# Patient Record
Sex: MALE | Race: WHITE | NOT HISPANIC OR LATINO | Employment: FULL TIME | RURAL
[De-identification: names, ages, dates, MRNs, and addresses within clinical notes are randomized per-mention and may not be internally consistent; named-entity substitution may affect disease eponyms.]

---

## 2023-12-11 ENCOUNTER — OFFICE VISIT (OUTPATIENT)
Dept: FAMILY MEDICINE CLINIC | Facility: CLINIC | Age: 58
End: 2023-12-11
Payer: COMMERCIAL

## 2023-12-11 VITALS
RESPIRATION RATE: 18 BRPM | WEIGHT: 217 LBS | DIASTOLIC BLOOD PRESSURE: 90 MMHG | HEIGHT: 68 IN | TEMPERATURE: 98.2 F | OXYGEN SATURATION: 98 % | BODY MASS INDEX: 32.89 KG/M2 | HEART RATE: 73 BPM | SYSTOLIC BLOOD PRESSURE: 148 MMHG

## 2023-12-11 DIAGNOSIS — I10 PRIMARY HYPERTENSION: ICD-10-CM

## 2023-12-11 DIAGNOSIS — K21.9 GASTROESOPHAGEAL REFLUX DISEASE, UNSPECIFIED WHETHER ESOPHAGITIS PRESENT: ICD-10-CM

## 2023-12-11 DIAGNOSIS — Z12.11 SCREEN FOR COLON CANCER: ICD-10-CM

## 2023-12-11 DIAGNOSIS — E78.5 DYSLIPIDEMIA: ICD-10-CM

## 2023-12-11 DIAGNOSIS — Z13.29 SCREENING FOR THYROID DISORDER: ICD-10-CM

## 2023-12-11 DIAGNOSIS — Z23 NEED FOR TDAP VACCINATION: ICD-10-CM

## 2023-12-11 DIAGNOSIS — E11.42 TYPE 2 DIABETES MELLITUS WITH DIABETIC POLYNEUROPATHY, WITHOUT LONG-TERM CURRENT USE OF INSULIN: ICD-10-CM

## 2023-12-11 DIAGNOSIS — Z11.59 NEED FOR HEPATITIS C SCREENING TEST: ICD-10-CM

## 2023-12-11 DIAGNOSIS — E66.9 MILD OBESITY: ICD-10-CM

## 2023-12-11 DIAGNOSIS — Z23 INFLUENZA VACCINATION ADMINISTERED AT CURRENT VISIT: ICD-10-CM

## 2023-12-11 DIAGNOSIS — Z01.818 PREOP EXAMINATION: Primary | ICD-10-CM

## 2023-12-11 DIAGNOSIS — E55.9 VITAMIN D DEFICIENCY: ICD-10-CM

## 2023-12-11 DIAGNOSIS — Z12.5 SCREENING FOR PROSTATE CANCER: ICD-10-CM

## 2023-12-11 LAB
EXPIRATION DATE: ABNORMAL
GLUCOSE BLDC GLUCOMTR-MCNC: 259 MG/DL (ref 70–130)
HBA1C MFR BLD: 7 % (ref 4.5–5.7)
Lab: ABNORMAL

## 2023-12-11 NOTE — PROGRESS NOTES
Venipuncture Blood Specimen Collection  Venipuncture performed in right arm by Sarahi Valladares MA with good hemostasis. Patient tolerated the procedure well without complications.   12/11/23   Sarahi Valladares MA

## 2023-12-11 NOTE — PROGRESS NOTES
New Patient Office Visit      Date: 2023  Patient Name: Raghu Leonardo  : 1965   MRN: [id number]     Chief Complaint:    Chief Complaint   Patient presents with    [facility] Care     Blood sugar issues       History of Present Illness: Raghu Leonardo is a 58 y.o. male who is here today for introductory visit to establish as a patient.  Previous patient of Dr. Talbert in [place].  History of historically difficult to control diabetes mellitus type 2 indicating diagnosis 3 to 4 years ago with a hemoglobin A1c of 14%, most recent hemoglobin A1c by history 12% several months prior.  Currently take metformin 1000 mg twice daily, Amaryl 4 mg daily, Actos 45 mg daily, and up until last week Ozempic 0.5 mg subcu weekly, the latter having been held most recent dose given significant diarrheal side effects.  Also hypertension taking lisinopril/HCTZ 20/25 mg daily, not checking blood pressures regularly, hyperlipidemia currently on atorvastatin 40 mg daily, GERD taking omeprazole 40 mg daily, history of possible steatohepatitis, and obesity.  Denies any chest pains palpitations dyspnea dizziness or edema, occasional bloating sensation in the stomach, GERD well-controlled, having had some loose stools attributed to Ozempic with no melena or hematochezia, improved since stopping Ozempic..  Does occasionally have some shooting pains in his feet but no numbness or tingling and no proprioception problems.  Current with diabetic eye evaluation.  Has never had colon cancer screening, no family history of colon cancer.  Denies any use of tobacco or any significant alcohol consumption.  Due for several vaccinations.    Subjective      Review of Systems:   Review of Systems    Past Medical History:   Past Medical History:   Diagnosis Date    Diabetes mellitus     Hyperlipidemia        Past Surgical History:   Past Surgical History:   Procedure Laterality Date    VASECTOMY         Family History: History reviewed. No  pertinent family history.    Social History:   Social History     Socioeconomic History    Marital status:    Tobacco Use    Smoking status: Never    Smokeless tobacco: Former     Types: Chew   Vaping Use    Vaping Use: Never used   Substance and Sexual Activity    Alcohol use: Never    Drug use: Never    Sexual activity: Not Currently     Partners: Female       Medications:     Current Outpatient Medications:     albuterol sulfate  (90 Base) MCG/ACT inhaler, Inhale 2 puffs Every 4 (Four) Hours As Needed for Wheezing or Shortness of Air for up to 10 days., Disp: 6.7 g, Rfl: 0    Aspirin Low Dose 81 MG EC tablet, Take 1 tablet by mouth Daily., Disp: , Rfl:     atorvastatin (LIPITOR) 40 MG tablet, , Disp: , Rfl:     glimepiride (AMARYL) 4 MG tablet, , Disp: , Rfl:     lisinopril-hydrochlorothiazide (PRINZIDE,ZESTORETIC) 20-25 MG per tablet, , Disp: , Rfl:     metFORMIN (GLUCOPHAGE) 1000 MG tablet, , Disp: , Rfl:     omeprazole (priLOSEC) 40 MG capsule, Take 1 capsule by mouth Daily., Disp: , Rfl:     pioglitazone (ACTOS) 45 MG tablet, , Disp: , Rfl:     azithromycin (ZITHROMAX) 250 MG tablet, 2 tabs orally day 1, 1 tab orally once a days 2 through 5 (Patient not taking: Reported on 12/11/2023), Disp: 6 tablet, Rfl: 0    Continuous Blood Gluc  (Dexcom G6 ) device, See Admin Instructions. (Patient not taking: Reported on 12/11/2023), Disp: , Rfl:     Continuous Blood Gluc Sensor (Dexcom G6 Sensor), CHANGE EVERY 10 DAYS (Patient not taking: Reported on 12/11/2023), Disp: , Rfl:     Continuous Blood Gluc Transmit (Dexcom G6 Transmitter) misc, EVERY DAY (Patient not taking: Reported on 12/11/2023), Disp: , Rfl:     empagliflozin (Jardiance) 10 MG tablet tablet, Take 1 tablet by mouth Daily., Disp: 30 tablet, Rfl: 1    gemfibrozil (Lopid) 600 MG tablet, 600 mg. (Patient not taking: Reported on 12/11/2023), Disp: , Rfl:     pioglitazone (ACTOS) 15 MG tablet, 15 mg. (Patient not taking: Reported  "on 12/11/2023), Disp: , Rfl:     predniSONE (DELTASONE) 10 MG (21) dose pack, Take  by mouth Daily for 6 days. Use as directed on package (Patient not taking: Reported on 12/11/2023), Disp: 1 each, Rfl: 0    promethazine-dextromethorphan (PROMETHAZINE-DM) 6.25-15 MG/5ML syrup, Take 5 mL by mouth 4 (Four) Times a Day As Needed for Cough for up to 10 days. (Patient not taking: Reported on 12/11/2023), Disp: 118 mL, Rfl: 0    Tirzepatide (Mounjaro) 2.5 MG/0.5ML solution pen-injector, Inject 0.5 mL under the skin into the appropriate area as directed Every 7 (Seven) Days., Disp: 2 mL, Rfl: 0    Allergies:   No Known Allergies    Objective     Physical Exam:  Vital Signs:   Vitals:    12/11/23 1433   BP: 148/90   BP Location: Left arm   Patient Position: Sitting   Cuff Size: Adult   Pulse: 73   Resp: 18   Temp: 98.2 °F (36.8 °C)   TempSrc: Temporal   SpO2: 98%   Weight: 98.4 kg (217 lb)   Height: 172.7 cm (68\")     Body mass index is 32.99 kg/m².           Physical Exam  General: Pleasant 58-year-old male, BMI of 32.9 with no prior comparison, no acute distress.  Head and neck: Hair shaven bald, neck supple with no masses or tenderness  Lungs clear with no wheeze tachypnea or cough  Cardiac regular rate rhythm with no murmurs gallops or rubs, no dependent edema  Abdomen with androgenic obesity soft and nontender with no organomegaly or masses and normal bowel sounds  Bipedal exam with 2+ pulses, no edema, normal sensation in both feet to fine touch vibration and pinprick, motor exam intact  Diabetic Foot Exam Performed and Monofilament Test Performed     POCT Results (if applicable):   No results found for this or any previous visit.      ECG 12 Lead    Date/Time: 12/11/2023 3:49 PM  Performed by: Glynn Lujan MD    Authorized by: Glynn Lujan MD  Comparison: not compared with previous ECG   Previous ECG: no previous ECG available  Comments: Normal sinus rhythm rate 65 with baseline artifact, no abnormalities " noted, no prior EKG for comparison         Assessment / Plan      Assessment/Plan:   Diagnoses and all orders for this visit:    1. Preop examination (Primary)  -     CBC & Differential; Future  -     Comprehensive Metabolic Panel; Future  -     ECG 12 Lead  Patient being given referral for introductory screening colonoscopy, with EKG normal, no concerning history regarding administration of conscious sedation, obtain screening labs as noted.  2. Type 2 diabetes mellitus with diabetic polyneuropathy, without long-term current use of insulin  -     MicroAlbumin, Urine, Random - Urine, Clean Catch; Future  -     empagliflozin (Jardiance) 10 MG tablet tablet; Take 1 tablet by mouth Daily.  Dispense: 30 tablet; Refill: 1  -     Tirzepatide (Mounjaro) 2.5 MG/0.5ML solution pen-injector; Inject 0.5 mL under the skin into the appropriate area as directed Every 7 (Seven) Days.  Dispense: 2 mL; Refill: 0  -     POC Glycosylated Hemoglobin (Hb A1C)  -     POC Glucose  Diagnosed by history 3 to 4 years ago, initially having had a hemoglobin A1c approximately 14% per patient, noting several months ago had a hemoglobin A1c of 12%, historically difficult to control.  Patient currently taking Amaryl 4 mg daily, Actos 45 mg daily, and metformin 1000 mg twice daily, also having been taking Ozempic 0.5 mg subcu weekly up until the last week discontinued given significant GI symptoms, and by history also having been prescribed, as I can best determine, Humalog 70/30 with unknown dosing regimen.  Indicates he has not been on Humalog for at least a month, given per his report that his previous physician would not refill this despite multiple calls by the patient.  He does have polyneuropathy manifested by intermittent subjective pain which is not lifestyle limiting primarily in the feet but also the fingers, with no objective abnormalities noted on centimeters today his hemoglobin A1c today is much improved at 7.0% on the above regimen,  but based upon benefits of weight loss and cardiovascular and renal benefits, will initiate trial of Jardiance 10 mg daily with side effect profile and mechanism action discussed, as well as try different GLP 1/GIP agonist using Mounjaro 2.5 mg subcu weekly in place of the Ozempic, with side effect profile potential discussed as well as mechanism of action.  Patient indicates diabetic eye evaluation up-to-date approximately 6 months ago, and does take ACE inhibitor.  Reassess clinically in 1 month.  Continue monitoring blood sugars closely.  3. Primary hypertension  -     Urinalysis With Culture If Indicated -; Future  -     ECG 12 Lead  Stage II elevation acutely, chronic control unknown taking lisinopril/HCTZ 20/25 mg daily.  Check blood pressures regularly with ideal parameters discussed, reassess clinically at his next visit.  EKG today unremarkable.  4. Dyslipidemia  -     Lipid Panel; Future  Taking atorvastatin 40 mg nightly, previously apparently having been on Lopid which was discontinued.  Update lipid profile.  5. Vitamin D deficiency  -     Vitamin D,25-Hydroxy; Future    6. Mild obesity  Discussed need for improvement in lifestyle with regular activity level/exercise along with healthy low glycemic low caloric diet.  Anticipate weight loss benefits with initiation of Mounjaro as noted above.  7. Gastroesophageal reflux disease, unspecified whether esophagitis present  Generally well-controlled taking omeprazole 40 mg daily.  8. Screen for colon cancer  -     Ambulatory Referral For Screening Colonoscopy  Refer for introductory screening colonoscopy.  9. Screening for prostate cancer  -     PSA Screen; Future    10. Screening for thyroid disorder  -     TSH Rfx On Abnormal To Free T4; Future    11. Need for hepatitis C screening test  -     Hepatitis C Antibody; Future    12. Influenza vaccination administered at current visit  -     Fluzone (or Fluarix & Flulaval for VFC) >6mos    13. Need for Tdap  vaccination  -     Tdap Vaccine Greater Than or Equal To 6yo IM    I also advised patient obtain Shingrix and COVID-19 vaccines outside the office, citing safety and efficacy.    65 minutes spent with patient reviewing his extensive health history, updating current complaints, performing physical exam, formulation of treatment plan including extensive discussion regarding medication mechanisms of action, and documentation in EMR.  This time does not include time obtaining and interpreting EKG.    Vaccine Counseling:  “Discussed risks/benefits to vaccination, reviewed components of the vaccine, discussed VIS, discussed informed consent, informed consent obtained. Patient/Parent was allowed to accept or refuse vaccine. Questions answered to satisfactory state of patient/Parent. We reviewed typical age appropriate and seasonally appropriate vaccinations. Reviewed immunization history and updated state vaccination form as needed. Patient was counseled on Influenza  Tdap    Follow Up:   Return in about 1 month (around 1/11/2024) for Annual physical.    At [facility], we believe that sharing information builds trust and better relationships. You are receiving this note because you recently visited [facility]. It is possible you will see health information before a provider has talked with you about it. This kind of information can be easy to misunderstand. To help you fully understand what it means for your health, we urge you to discuss this note with your provider.    Glynn Lujan MD  [facility]

## 2023-12-12 ENCOUNTER — TELEPHONE (OUTPATIENT)
Dept: FAMILY MEDICINE CLINIC | Facility: CLINIC | Age: 58
End: 2023-12-12
Payer: COMMERCIAL

## 2023-12-12 LAB
25(OH)D3+25(OH)D2 SERPL-MCNC: 44.6 NG/ML (ref 30–100)
ALBUMIN SERPL-MCNC: 4.3 G/DL (ref 3.8–4.9)
ALBUMIN/GLOB SERPL: 2.2 {RATIO} (ref 1.2–2.2)
ALP SERPL-CCNC: 91 IU/L (ref 44–121)
ALT SERPL-CCNC: 26 IU/L (ref 0–44)
APPEARANCE UR: CLEAR
AST SERPL-CCNC: 15 IU/L (ref 0–40)
BACTERIA #/AREA URNS HPF: NORMAL /[HPF]
BASOPHILS # BLD AUTO: 0 X10E3/UL (ref 0–0.2)
BASOPHILS NFR BLD AUTO: 0 %
BILIRUB SERPL-MCNC: 0.4 MG/DL (ref 0–1.2)
BILIRUB UR QL STRIP: NEGATIVE
BUN SERPL-MCNC: 23 MG/DL (ref 6–24)
BUN/CREAT SERPL: 17 (ref 9–20)
CALCIUM SERPL-MCNC: 9.9 MG/DL (ref 8.7–10.2)
CASTS URNS QL MICRO: NORMAL /LPF
CHLORIDE SERPL-SCNC: 100 MMOL/L (ref 96–106)
CHOLEST SERPL-MCNC: 95 MG/DL (ref 100–199)
CO2 SERPL-SCNC: 23 MMOL/L (ref 20–29)
COLOR UR: YELLOW
CREAT SERPL-MCNC: 1.37 MG/DL (ref 0.76–1.27)
EGFRCR SERPLBLD CKD-EPI 2021: 60 ML/MIN/1.73
EOSINOPHIL # BLD AUTO: 0.1 X10E3/UL (ref 0–0.4)
EOSINOPHIL NFR BLD AUTO: 1 %
EPI CELLS #/AREA URNS HPF: NORMAL /HPF (ref 0–10)
ERYTHROCYTE [DISTWIDTH] IN BLOOD BY AUTOMATED COUNT: 14 % (ref 11.6–15.4)
GLOBULIN SER CALC-MCNC: 2 G/DL (ref 1.5–4.5)
GLUCOSE SERPL-MCNC: 237 MG/DL (ref 70–99)
GLUCOSE UR QL STRIP: ABNORMAL
HCT VFR BLD AUTO: 39.4 % (ref 37.5–51)
HCV IGG SERPL QL IA: NON REACTIVE
HDLC SERPL-MCNC: 30 MG/DL
HGB BLD-MCNC: 13.2 G/DL (ref 13–17.7)
HGB UR QL STRIP: NEGATIVE
IMM GRANULOCYTES # BLD AUTO: 0.2 X10E3/UL (ref 0–0.1)
IMM GRANULOCYTES NFR BLD AUTO: 2 %
KETONES UR QL STRIP: NEGATIVE
LDLC SERPL CALC-MCNC: 40 MG/DL (ref 0–99)
LEUKOCYTE ESTERASE UR QL STRIP: NEGATIVE
LYMPHOCYTES # BLD AUTO: 3.7 X10E3/UL (ref 0.7–3.1)
LYMPHOCYTES NFR BLD AUTO: 24 %
MCH RBC QN AUTO: 29.1 PG (ref 26.6–33)
MCHC RBC AUTO-ENTMCNC: 33.5 G/DL (ref 31.5–35.7)
MCV RBC AUTO: 87 FL (ref 79–97)
MICRO URNS: ABNORMAL
MICRO URNS: ABNORMAL
MICROALBUMIN UR-MCNC: 11.1 UG/ML
MONOCYTES # BLD AUTO: 0.9 X10E3/UL (ref 0.1–0.9)
MONOCYTES NFR BLD AUTO: 6 %
NEUTROPHILS # BLD AUTO: 10.4 X10E3/UL (ref 1.4–7)
NEUTROPHILS NFR BLD AUTO: 67 %
NITRITE UR QL STRIP: NEGATIVE
PH UR STRIP: 5.5 [PH] (ref 5–7.5)
PLATELET # BLD AUTO: 338 X10E3/UL (ref 150–450)
POTASSIUM SERPL-SCNC: 4.4 MMOL/L (ref 3.5–5.2)
PROT SERPL-MCNC: 6.3 G/DL (ref 6–8.5)
PROT UR QL STRIP: NEGATIVE
PSA SERPL-MCNC: 0.5 NG/ML (ref 0–4)
RBC # BLD AUTO: 4.54 X10E6/UL (ref 4.14–5.8)
RBC #/AREA URNS HPF: NORMAL /HPF (ref 0–2)
SODIUM SERPL-SCNC: 137 MMOL/L (ref 134–144)
SP GR UR STRIP: 1.02 (ref 1–1.03)
T4 FREE SERPL-MCNC: 1.59 NG/DL (ref 0.82–1.77)
TRIGL SERPL-MCNC: 142 MG/DL (ref 0–149)
TSH SERPL DL<=0.005 MIU/L-ACNC: 0.4 UIU/ML (ref 0.45–4.5)
URINALYSIS REFLEX: ABNORMAL
UROBILINOGEN UR STRIP-MCNC: 0.2 MG/DL (ref 0.2–1)
VLDLC SERPL CALC-MCNC: 25 MG/DL (ref 5–40)
WBC # BLD AUTO: 15.3 X10E3/UL (ref 3.4–10.8)
WBC #/AREA URNS HPF: NORMAL /HPF (ref 0–5)

## 2023-12-12 NOTE — TELEPHONE ENCOUNTER
Review of lab testing from 12/11/2023 as follows:    TSH low at 0.96 with normal 0.45-4.50  Free T4 normal at 1.59 with normal 0.82-1.77  Vitamin D 44.6  PSA 0.5  Hepatitis C antibody nonreactive  Total cholesterol 95, triglyceride 142, HDL 30, LDL 40  CMP with a creatinine of 1.37, GFR 60, glucose 237, otherwise normal  CBC with a white count of 15.3 with 0.2 immature granulocytes on differential, hemoglobin 13.2, platelets 338,000  Urinalysis and urine microalbumin both normal  Office POC hemoglobin 7.0%    Assessment/plan:  1.  Diabetes mellitus type 2 with suboptimal to improve control by history from prior value.  Per office note yesterday, adding low-dose Mounjaro 2.5 mg subcu weekly and Jardiance 10 mg daily in addition to his Amaryl, metformin and Actos.  If blood sugars drop less than 80 we will cut back initially on the Amaryl then possibly Actos dosing.  He is to follow-up in 1 month.  2.  Subclinical hyperthyroidism.  Repeat TSH and free T4 adding T3 and thyroid-stimulating antibody at his follow-up visit  3.  Chronic kidney disease stage II.  On ACE inhibitor.  Follow-up within 6 months.  3.  Dyslipidemia, overall satisfactory control on Lipitor 40 mg nightly other than low HDL.  Pursue healthy lifestyle and continue current regimen.  4.  Remainder of laboratory testing satisfactory.    Addendum 12/12/2023:  Unsuccessful attempted contact patient with no option to leave message.  Will reattempt to contact patient tomorrow    Addendum 12/13/2023, 12/14/2023: Message left on answering machine requesting call back regarding test results.    Addendum 12/15/2023:  Phone conversation with patient regarding the above.  Plan as above.

## 2023-12-13 ENCOUNTER — PATIENT ROUNDING (BHMG ONLY) (OUTPATIENT)
Dept: FAMILY MEDICINE CLINIC | Facility: CLINIC | Age: 58
End: 2023-12-13
Payer: COMMERCIAL

## 2024-01-09 DIAGNOSIS — E11.42 TYPE 2 DIABETES MELLITUS WITH DIABETIC POLYNEUROPATHY, WITHOUT LONG-TERM CURRENT USE OF INSULIN: ICD-10-CM

## 2024-01-09 NOTE — TELEPHONE ENCOUNTER
Caller: Raghu Leonardo    Relationship: Self    Best call back number: [phone]     Requested Prescriptions:   Requested Prescriptions     Pending Prescriptions Disp Refills    Tirzepatide (Mounjaro) 2.5 MG/0.5ML solution pen-injector pen 2 mL 0     Sig: Inject 0.5 mL under the skin into the appropriate area as directed Every 7 (Seven) Days.    empagliflozin (Jardiance) 10 MG tablet tablet 30 tablet 1     Sig: Take 1 tablet by mouth Daily.        Pharmacy where request should be sent: [facility]/PHARMACY #[id number] - [place], [place] - [address] [place] [address] - [phone] [facility] [phone] FX     Last office visit with prescribing clinician: 12/11/2023   Last telemedicine visit with prescribing clinician: Visit date not found   Next office visit with prescribing clinician: Visit date not found     Additional details provided by patient: OUT OF THE MOUNJARO    Does the patient have less than a 3 day supply:  [x] Yes  [] No    Would you like a call back once the refill request has been completed: [] Yes [x] No    If the office needs to give you a call back, can they leave a voicemail: [] Yes [x] No    Chavo Dubon, PCT   01/09/24 15:23 EST

## 2024-02-06 DIAGNOSIS — E11.42 TYPE 2 DIABETES MELLITUS WITH DIABETIC POLYNEUROPATHY, WITHOUT LONG-TERM CURRENT USE OF INSULIN: ICD-10-CM

## 2024-02-06 RX ORDER — TIRZEPATIDE 2.5 MG/.5ML
INJECTION, SOLUTION SUBCUTANEOUS
Qty: 5 ML | Refills: 1 | Status: SHIPPED | OUTPATIENT
Start: 2024-02-06

## 2024-03-22 DIAGNOSIS — E11.42 TYPE 2 DIABETES MELLITUS WITH DIABETIC POLYNEUROPATHY, WITHOUT LONG-TERM CURRENT USE OF INSULIN: ICD-10-CM

## 2024-03-22 NOTE — TELEPHONE ENCOUNTER
Caller: [email or web address]    Relationship: Emergency Contact    Best call back number: [phone]     Requested Prescriptions:   Requested Prescriptions     Pending Prescriptions Disp Refills    empagliflozin (Jardiance) 10 MG tablet tablet 30 tablet 0     Sig: Take 1 tablet by mouth Daily.        Pharmacy where request should be sent: [facility]/PHARMACY #[id number] - [address] AT [place] [address] - [phone] [facility] [phone] FX     Last office visit with prescribing clinician: 12/11/2023   Last telemedicine visit with prescribing clinician: Visit date not found   Next office visit with prescribing clinician: Visit date not found     Additional details provided by patient: PATIENT IS COMPLETELY OUT OF THIS MEDICATION    PATIENT WAS INFORMED BY PHARMACY THAT HE WAS OUT OF [employer] REFILLS (PATIENT AND SPOUSE BELIEVE THAT INSURANCE HAS BEEN PUSHING FOR MAIL-IN PHARMACY INSTEAD)    RISA, THE PATIENT'S SPOUSE, STATED THAT THEY WERE TOLD THAT A TEMPORARY SUPPLY COULD BE SENT IN FOR THEM, AND THAT THE PHARMACY WOULD BE ABLE TO WORK THINGS OUT FROM THERE    RISA IS REQUESTING A 10-DAY SUPPLY BE SENT IN FOR THE PATIENT      Does the patient have less than a 3 day supply:  [x] Yes  [] No    Would you like a call back once the refill request has been completed: [] Yes [x] No    If the office needs to give you a call back, can they leave a voicemail: [] Yes [x] No    Debbie Tripp Rep   03/22/24 12:22 EDT

## 2024-03-25 DIAGNOSIS — E11.42 TYPE 2 DIABETES MELLITUS WITH DIABETIC POLYNEUROPATHY, WITHOUT LONG-TERM CURRENT USE OF INSULIN: ICD-10-CM

## 2024-04-02 DIAGNOSIS — E11.42 TYPE 2 DIABETES MELLITUS WITH DIABETIC POLYNEUROPATHY, WITHOUT LONG-TERM CURRENT USE OF INSULIN: ICD-10-CM

## 2024-04-02 NOTE — TELEPHONE ENCOUNTER
Dr. Maki has approved for his rx to be filled. I have spoke with him and have let him know he is due an appt. TF

## 2024-04-09 DIAGNOSIS — E11.42 TYPE 2 DIABETES MELLITUS WITH DIABETIC POLYNEUROPATHY, WITHOUT LONG-TERM CURRENT USE OF INSULIN: ICD-10-CM

## 2024-04-09 RX ORDER — TIRZEPATIDE 2.5 MG/.5ML
INJECTION, SOLUTION SUBCUTANEOUS
Qty: 2 ML | Refills: 0 | Status: SHIPPED | OUTPATIENT
Start: 2024-04-09

## 2024-04-24 ENCOUNTER — TELEPHONE (OUTPATIENT)
Dept: FAMILY MEDICINE CLINIC | Facility: CLINIC | Age: 59
End: 2024-04-24
Payer: COMMERCIAL

## 2024-04-24 NOTE — TELEPHONE ENCOUNTER
CALLED PT TO LET HIM KNOW THAT DR GUERRERO WILL BE OUT OF THE OFFICE MAY 7TH SO WE WILL NEED TO RESCHEDULE THAT APPT. HUB MAY READ AND SCHEDULE.

## 2024-04-25 NOTE — TELEPHONE ENCOUNTER
Name: Raghu Leonardo      Relationship: Self      Best Callback Number:  [phone]       HUB PROVIDED THE RELAY MESSAGE FROM THE OFFICE      PATIENT: VOICED UNDERSTANDING AND HAS NO FURTHER QUESTIONS AT THIS TIME    ADDITIONAL INFORMATION: NO PHYSICALS AVAILABLE FOR REST OF THIS YEAR WITH PCP. WT TO [facility]AN TO GET PATIENT WORKED IN.

## 2024-05-24 ENCOUNTER — OFFICE VISIT (OUTPATIENT)
Dept: FAMILY MEDICINE CLINIC | Facility: CLINIC | Age: 59
End: 2024-05-24
Payer: COMMERCIAL

## 2024-05-24 VITALS
HEART RATE: 81 BPM | TEMPERATURE: 97.6 F | BODY MASS INDEX: 32.19 KG/M2 | OXYGEN SATURATION: 98 % | SYSTOLIC BLOOD PRESSURE: 130 MMHG | WEIGHT: 212.4 LBS | HEIGHT: 68 IN | DIASTOLIC BLOOD PRESSURE: 77 MMHG

## 2024-05-24 DIAGNOSIS — R53.83 FATIGUE, UNSPECIFIED TYPE: ICD-10-CM

## 2024-05-24 DIAGNOSIS — E11.42 TYPE 2 DIABETES MELLITUS WITH DIABETIC POLYNEUROPATHY, WITHOUT LONG-TERM CURRENT USE OF INSULIN: ICD-10-CM

## 2024-05-24 DIAGNOSIS — G57.11 MERALGIA PARESTHETICA OF RIGHT SIDE: ICD-10-CM

## 2024-05-24 DIAGNOSIS — Z00.01 ENCOUNTER FOR GENERAL ADULT MEDICAL EXAMINATION WITH ABNORMAL FINDINGS: Primary | ICD-10-CM

## 2024-05-24 DIAGNOSIS — K21.9 GASTROESOPHAGEAL REFLUX DISEASE, UNSPECIFIED WHETHER ESOPHAGITIS PRESENT: ICD-10-CM

## 2024-05-24 DIAGNOSIS — M54.32 LEFT SIDED SCIATICA: ICD-10-CM

## 2024-05-24 DIAGNOSIS — N18.2 CHRONIC KIDNEY DISEASE, STAGE II (MILD): ICD-10-CM

## 2024-05-24 DIAGNOSIS — E05.90 SUBCLINICAL HYPERTHYROIDISM: ICD-10-CM

## 2024-05-24 DIAGNOSIS — E55.9 VITAMIN D DEFICIENCY: ICD-10-CM

## 2024-05-24 DIAGNOSIS — G56.03 BILATERAL CARPAL TUNNEL SYNDROME: ICD-10-CM

## 2024-05-24 DIAGNOSIS — I10 PRIMARY HYPERTENSION: ICD-10-CM

## 2024-05-24 DIAGNOSIS — Z23 NEED FOR PROPHYLACTIC VACCINATION AGAINST STREPTOCOCCUS PNEUMONIAE (PNEUMOCOCCUS): ICD-10-CM

## 2024-05-24 DIAGNOSIS — E66.9 MILD OBESITY: ICD-10-CM

## 2024-05-24 DIAGNOSIS — E78.5 DYSLIPIDEMIA: ICD-10-CM

## 2024-05-24 LAB
EXPIRATION DATE: ABNORMAL
EXPIRATION DATE: ABNORMAL
GLUCOSE BLDC GLUCOMTR-MCNC: 185 MG/DL (ref 70–130)
HBA1C MFR BLD: 6 % (ref 4.5–5.7)
Lab: ABNORMAL
Lab: ABNORMAL

## 2024-05-24 RX ORDER — AMITRIPTYLINE HYDROCHLORIDE 25 MG/1
25 TABLET, FILM COATED ORAL NIGHTLY
Qty: 90 TABLET | Refills: 3 | Status: SHIPPED | OUTPATIENT
Start: 2024-05-24

## 2024-05-24 NOTE — ASSESSMENT & PLAN NOTE
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  Discussed need to attempt weight loss and avoid constrictive closing and belts.

## 2024-05-24 NOTE — ASSESSMENT & PLAN NOTE
Most recent creatinine 1.37 with GFR 16 12/2023.  Taking lisinopril/HCTZ 20/25 mg daily.  Will repeat BMP and make further recommendations accordingly.

## 2024-05-24 NOTE — ASSESSMENT & PLAN NOTE
Describes chronic intermittent left-sided numbness and tingling discomfort in his entire leg.  Ports chiropractic manipulation extensively with no improvement.  Initiate trial of conservative dose of amitriptyline 25 mg nightly with side effect profile potential discussed.  Advise if problems or for lack of therapeutic benefit.

## 2024-05-24 NOTE — ASSESSMENT & PLAN NOTE
5 pound weight loss in the last 5 months, likely augmented by use of Mounjaro 2.5 mg subcu weekly, this being prescribed primarily for his diabetes.  Increasing Mounjaro further up to 5 mg subcu weekly for improved glycemic control and cardiovascular benefits, anticipating likely to have further weight loss, also pursue healthy lifestyle efforts.

## 2024-05-24 NOTE — ASSESSMENT & PLAN NOTE
Mildly depressed TSH with normal free T4 per testing in 12/2023.  Repeat TSH, free T4, adding thyroid-stimulating antibody, calling for results.

## 2024-05-24 NOTE — ASSESSMENT & PLAN NOTE
Recommended initial trial of bilateral wrist splints with proper positioning discussed.  Advise if not helping.

## 2024-05-24 NOTE — PROGRESS NOTES
Male Physical Note      Date: 2024   Patient Name: Raghu Leonardo  : 1965   MRN: [id number]     Chief Complaint:    Chief Complaint   Patient presents with    Annual Exam       History of Present Illness: Raghu Leonardo is a 58 y.o. male who is here today for their annual health maintenance and physical.  Seen 5 months ago in the office having metabolic syndrome with diabetes hypertension hyperlipidemia and obesity.  Hemoglobin A1c at that time 7.0% taking metformin 1000 mg twice daily, Amaryl 4 mg daily, Actos 45 mg daily, having had the addition of Jardiance 10 mg daily and Mounjaro 2.5 mg weekly.  Blood sugars have been averaging 1 30-1 40 fasting, with hemoglobin A1c today improved to 6.0%.  He does note occasionally having some low sugars in the 60s at nighttime but otherwise no major problems.  Due for his yearly diabetic eye evaluation.  Taking lisinopril HCTZ for hypertension with blood pressures normal, hyperlipidemia taking atorvastatin but no longer on Lopid, GERD well-controlled with omeprazole, per lab testing 2023 had a slightly low TSH with normal free T4 consistent with subclinical hyperthyroidism due for repeat testing, also chronic kidney disease stage II with creatinine 1.37 and GFR 16 2023.  Obesity having a 5 pound weight loss in the last 6 months.  Denies chest pains palpitations dyspnea dizziness or edema.  His primary complaint relates to bilateral intermittent numbness of his hands especially with use, as well as when he wakes up in the morning, as well as an intermittent numbness on the right anterolateral thigh and knee discomfort associate with the tingling and numbness of his entire leg down to the foot but no actual back pain and no weakness.  Denies any loss of bowel or bladder control.  He has seen a chiropractor multiple times with no benefit in his symptoms.  He does have some vague fatigue but no fevers chills or sweats.  No  or GI complaints at this  "time.  Health maintenance includes colonoscopy obtained approximately 1/2024 by history with Dr. Hernandez, patient reporting \"normal\", with procedure report currently pending to me, EKG in 12/2023 normal, full set of labs in 12/2023 obtained with CKD 2 and his subclinical hyperthyroidism findings as noted, due for Prevnar 20 Shingrix and COVID-19 vaccines, status post Tdap last visit.  Due for diabetic eye evaluation.  Due for updated limited lab testing to reassess his renal function and thyroid..      Subjective      Review of Systems:   Review of Systems    Past Medical History, Social History, Family History and Care Team were all reviewed with patient and updated as appropriate.     Medications:     Current Outpatient Medications:     atorvastatin (LIPITOR) 40 MG tablet, , Disp: , Rfl:     empagliflozin (Jardiance) 10 MG tablet tablet, Take 1 tablet by mouth Daily. NEEDS AN APPT, Disp: 90 tablet, Rfl: 0    lisinopril-hydrochlorothiazide (PRINZIDE,ZESTORETIC) 20-25 MG per tablet, , Disp: , Rfl:     metFORMIN (GLUCOPHAGE) 1000 MG tablet, , Disp: , Rfl:     omeprazole (priLOSEC) 40 MG capsule, Take 1 capsule by mouth Daily., Disp: , Rfl:     pioglitazone (ACTOS) 15 MG tablet, 1 tablet., Disp: , Rfl:     amitriptyline (ELAVIL) 25 MG tablet, Take 1 tablet by mouth Every Night. For leg pain, Disp: 90 tablet, Rfl: 3    Tirzepatide (MOUNJARO) 5 MG/0.5ML solution pen-injector pen, Inject 0.5 mL under the skin into the appropriate area as directed 1 (One) Time Per Week., Disp: 2 mL, Rfl: 0    Allergies:   No Known Allergies    Immunizations:  Health Maintenance Summary            Overdue - COLORECTAL CANCER SCREENING (View Topic Details) Never done      No completion, postpone, or frequency change history exists for this topic.              Overdue - DIABETIC EYE EXAM (Yearly) Never done      No completion, postpone, or frequency change history exists for this topic.              Overdue - Hepatitis B (1 of 3 - 19+ " 3-dose series) Never done      No completion, postpone, or frequency change history exists for this topic.              Postponed - ZOSTER VACCINE (1 of 2) Postponed until 5/24/2024 05/24/2024  Postponed until 5/24/2024 by Huong Holliday (Patient Refused - will get at pharmacy)              Postponed - COVID-19 Vaccine (3 - 2023-24 season) Postponed until 12/31/2024 05/24/2024  Postponed until 12/31/2024 by Huong Holliday (Patient Refused - thinking about will get at pharmacy)    06/20/2021  Imm Admin: COVID-19 (MODERNA) 1st,2nd,3rd Dose Monovalent    05/23/2021  Imm Admin: COVID-19 (MODERNA) 1st,2nd,3rd Dose Monovalent              INFLUENZA VACCINE (Yearly - August to March) Next due on 8/1/2024 12/11/2023  Imm Admin: Fluzone (or Fluarix & Flulaval for VFC) >6mos              HEMOGLOBIN A1C (Every 6 Months) Next due on 11/24/2024 05/24/2024  Hemoglobin A1C component of POC Glycosylated Hemoglobin (Hb A1C)    12/11/2023  Hemoglobin A1C component of POC Glycosylated Hemoglobin (Hb A1C)              DIABETIC FOOT EXAM (Yearly) Next due on 12/11/2024 12/11/2023  SmartData: WORKFLOW - DIABETES - DIABETIC FOOT EXAM PERFORMED    12/11/2023  SmartData: FINDINGS - TESTS - NEUROLOGY - MONOFILAMENT TEST - MONOFILAMENT TEST PERFORMED    12/11/2023  SmartData: WORKFLOW - DIABETES - DIABETIC FOOT EXAM PERFORMED              LIPID PANEL (Yearly) Next due on 12/11/2024 12/11/2023  Lipid Panel              URINE MICROALBUMIN (Yearly) Next due on 12/11/2024 12/11/2023  Microalbumin, Urine component of MicroAlbumin, Urine, Random - Urine, Clean Catch              ANNUAL PHYSICAL (Yearly) Next due on 5/24/2025 05/24/2024  Done              BMI FOLLOWUP (Yearly) Next due on 5/24/2025 05/24/2024  Smart[employer]: WORKFLOW - QUALITY MEASUREMENT - DOCUMENTED WEIGHT FOLLOW-UP PLAN              TDAP/TD VACCINES (2 - Td or Tdap) Next due on 12/11/2033 12/11/2023  Imm Admin: Tdap               "HEPATITIS C SCREENING  Completed      12/11/2023  Hep C Virus Ab component of Hepatitis C Antibody              Pneumococcal Vaccine 0-64 (Series Information) Completed      05/24/2024  Imm Admin: Pneumococcal Conjugate 20-Valent (PCV20)                    Orders Placed This Encounter   Procedures    Pneumococcal Conjugate Vaccine 20-Valent All       Colorectal Screening:   Approximately 1/2024, reportedly normal with procedure report to me currently pending  Last Completed Colonoscopy       This patient has no relevant Health Maintenance data.          CT for Smoker (Age 50-80, 20pk yr within last 15 years): N/A  Bone Density/DEXA (high risk): N/A  Hep C (Age 18-79 once): Negative  HIV (Age 15-65 once) : N/A  PSA (Over age 50, C Level Recommendation): Normal  US Aorta (For male smokers, age 65): N/A  A1c:   Hemoglobin A1C   Date Value Ref Range Status   05/24/2024 6.0 (A) 4.5 - 5.7 % Final     Lipid panel: No results found for: \"LABLIPI\"    The ASCVD Risk score (Enzo LUNA, et al., 2019) failed to calculate for the following reasons:    The valid total cholesterol range is 130 to 320 mg/dL    Dermatology: N/A  Ophthalmologist: Due for update  Dentist: Regular checkups pursued    Tobacco Use: Medium Risk (5/24/2024)    Patient History     Smoking Tobacco Use: Never     Smokeless Tobacco Use: Former     Passive Exposure: Not on file       Social History     Substance and Sexual Activity   Alcohol Use Never        Social History     Substance and Sexual Activity   Drug Use Never        Diet/Physical activity: Healthy diet, moderate physical activity    Sexual health: No concern, contraception used    PHQ-2 Depression Screening  PHQ-9 Total Score:           Objective     Physical Exam:  Vital Signs:   Vitals:    05/24/24 1535   BP: 130/77   BP Location: Left arm   Patient Position: Sitting   Cuff Size: Adult   Pulse: 81   Temp: 97.6 °F (36.4 °C)   TempSrc: Temporal   SpO2: 98%   Weight: 96.3 kg (212 lb 6.4 oz)   Height: " "172.7 cm (68\")     Facility age limit for growth %kirk is 20 years.  Body mass index is 32.3 kg/m².     Physical Exam  Vitals and nursing note reviewed.   Constitutional:       General: He is not in acute distress.     Appearance: Normal appearance. He is obese. He is not ill-appearing.      Comments: Pleasant, healthy, no acute distress, alert and oriented, fluent speech, BMI 32.3 representing a 5 pound weight loss in the last 5 months   HENT:      Head: Normocephalic and atraumatic.      Right Ear: Tympanic membrane, ear canal and external ear normal.      Left Ear: Tympanic membrane, ear canal and external ear normal.      Nose: Nose normal.      Mouth/Throat:      Mouth: Mucous membranes are moist.      Pharynx: Oropharynx is clear.      Comments: Good dentition  Eyes:      Extraocular Movements: Extraocular movements intact.      Conjunctiva/sclera: Conjunctivae normal.      Pupils: Pupils are equal, round, and reactive to light.   Neck:      Vascular: No carotid bruit.      Comments: No cervical periclavicular or axillary or inguinal adenopathy  Cardiovascular:      Rate and Rhythm: Normal rate and regular rhythm.      Pulses: Normal pulses.      Heart sounds: Normal heart sounds. No murmur heard.     No friction rub. No gallop.      Comments: 2+ peripheral pulses with no carotid bruits, no dependent edema  Pulmonary:      Effort: Pulmonary effort is normal. No respiratory distress.      Breath sounds: Normal breath sounds.   Abdominal:      General: Bowel sounds are normal. There is no distension.      Palpations: Abdomen is soft. There is no mass.      Tenderness: There is no abdominal tenderness. There is no guarding or rebound.      Hernia: No hernia is present.   Genitourinary:     Comments: Normal circumcised male with testes descended bilaterally without nodules or tenderness, no inguinal herniation or adenopathy, rectal exam deferred given recent colonoscopy with digital exam performed at that " time  Musculoskeletal:         General: No swelling, tenderness or deformity. Normal range of motion.      Cervical back: Normal range of motion and neck supple. No rigidity or tenderness.      Right lower leg: No edema.      Left lower leg: No edema.   Lymphadenopathy:      Cervical: No cervical adenopathy.   Skin:     General: Skin is warm and dry.      Capillary Refill: Capillary refill takes less than 2 seconds.      Findings: No lesion or rash.   Neurological:      Mental Status: He is alert and oriented to person, place, and time. Mental status is at baseline.      Cranial Nerves: No cranial nerve deficit.      Sensory: Sensory deficit present.      Motor: No weakness.      Coordination: Coordination normal.      Gait: Gait normal.      Deep Tendon Reflexes: Reflexes normal.      Comments: Subjective decrease sensation intermittently in the right anterolateral thigh as well as left leg more noted with ambulation and standing, positive Phalen sign both wrists with negative Tinel sign, normal strength and sensation otherwise   Psychiatric:         Mood and Affect: Mood normal.         Behavior: Behavior normal.         Thought Content: Thought content normal.         Judgment: Judgment normal.      Diabetic Foot Exam Performed and Monofilament Test Performed     POCT Results (if applicable):   Results for orders placed or performed in visit on 05/24/24   POC Glycosylated Hemoglobin (Hb A1C)    Specimen: Blood   Result Value Ref Range    Hemoglobin A1C 6.0 (A) 4.5 - 5.7 %    Lot Number [id number]     Expiration Date 01/24/2026    POC Glucose, Blood    Specimen: Blood   Result Value Ref Range    Glucose 185 (A) 70 - 130 mg/dL    Lot Number [id number]     Expiration Date 10/19/2024        Procedures    Assessment / Plan      Assessment/Plan:   Diagnoses and all orders for this visit:    1. Encounter for general adult medical examination with abnormal findings (Primary)  Assessment & Plan:  Pleasant 58-year-old male  presenting for complete physical, health issues being addressed as detailed below, health maintenance includes full set of laboratory testing from 12/2023 satisfactory other than updating limited testing as detailed below, colonoscopy from proximately 1/2024 reported by patient to be normal, data deficient with plan to obtain procedure report, administer Prevnar 20 today and recommend obtaining Shingrix and COVID-19 vaccines outside the office, keep up-to-date with flu vaccine recommendations, update diabetic eye evaluation.      2. Type 2 diabetes mellitus with diabetic polyneuropathy, without long-term current use of insulin  Assessment & Plan:  Some sensory changes more so on the left leg which may be related to sciatica, no abnormalities noted otherwise, hemoglobin A1c 7.0% in 12/2023 improved to 6.0% today taking multiple meds including metformin 1000 mg twice daily, Amaryl 4 mg daily, Actos 45 mg daily, Jardiance 10 mg daily, and Mounjaro 2.5 mg subcu weekly.  Given tight glycemic control, will plan to discontinue Amaryl well increase Mounjaro up to 5 mg subcu weekly, this given overall cardiovascular benefits as well as to further augment attempts at weight loss.  If his blood glucose is running consistently less than 80, he has been advised subsequently to discontinue his Actos as the next step.  Advised of any ongoing issues subsequently.  Patient due for yearly diabetic eye evaluation.  Continue healthy lifestyle efforts.    Orders:  -     POC Glycosylated Hemoglobin (Hb A1C)  -     POC Glucose, Blood  -     Tirzepatide (MOUNJARO) 5 MG/0.5ML solution pen-injector pen; Inject 0.5 mL under the skin into the appropriate area as directed 1 (One) Time Per Week.  Dispense: 2 mL; Refill: 0  -     Pneumococcal Conjugate Vaccine 20-Valent All    3. Primary hypertension  Assessment & Plan:  EKG performed in 12/2023 very satisfactory thus not repeated today.  Very satisfactory control taking lisinopril/HCTZ 20/25 mg  daily.  Continue current regimen.    Orders:  -     Basic Metabolic Panel; Future    4. Dyslipidemia  Assessment & Plan:  Satisfactory lipid profile in 12/2023 taking a atorvastatin 40 mg nightly.      5. Vitamin D deficiency    6. Subclinical hyperthyroidism  Assessment & Plan:  Mildly depressed TSH with normal free T4 per testing in 12/2023.  Repeat TSH, free T4, adding thyroid-stimulating antibody, calling for results.    Orders:  -     TSH; Future  -     T4, Free; Future  -     Thyroid Stimulating Immunoglobulin; Future    7. Chronic kidney disease, stage II (mild)  Assessment & Plan:  Most recent creatinine 1.37 with GFR 16 12/2023.  Taking lisinopril/HCTZ 20/25 mg daily.  Will repeat BMP and make further recommendations accordingly.    Orders:  -     Basic Metabolic Panel; Future    8. Mild obesity  Assessment & Plan:  5 pound weight loss in the last 5 months, likely augmented by use of Mounjaro 2.5 mg subcu weekly, this being prescribed primarily for his diabetes.  Increasing Mounjaro further up to 5 mg subcu weekly for improved glycemic control and cardiovascular benefits, anticipating likely to have further weight loss, also pursue healthy lifestyle efforts.      9. Gastroesophageal reflux disease, unspecified whether esophagitis present  Assessment & Plan:  Good control of his GERD symptoms taking omeprazole 40 mg daily.      10. Bilateral carpal tunnel syndrome  Assessment & Plan:  Recommended initial trial of bilateral wrist splints with proper positioning discussed.  Advise if not helping.      11. Meralgia paresthetica of right side  Assessment & Plan:  Discussed pathophysiology of this disorder with compression of the lateral femoral cutaneous nerve.  Discussed need to attempt weight loss and avoid constrictive closing and belts.      12. Left sided sciatica  Assessment & Plan:  Describes chronic intermittent left-sided numbness and tingling discomfort in his entire leg.  [facility] chiropractic  manipulation extensively with no improvement.  Initiate trial of conservative dose of amitriptyline 25 mg nightly with side effect profile potential discussed.  Advise if problems or for lack of therapeutic benefit.      13. Fatigue, unspecified type  Assessment & Plan:  Describes nonspecific fatigue though certainly appears clinically well.  Did have extensive laboratory testing in 12/2023 with only related mild abnormality being subclinical hyperthyroidism not likely relevant.  Will update limited testing including repeat TFTs, BMP, and testosterone level.    Orders:  -     Testosterone; Future    14. Need for prophylactic vaccination against Streptococcus pneumoniae (pneumococcus)  -     Pneumococcal Conjugate Vaccine 20-Valent All    Other orders  -     amitriptyline (ELAVIL) 25 MG tablet; Take 1 tablet by mouth Every Night. For leg pain  Dispense: 90 tablet; Refill: 3         Healthcare Maintenance:  Counseling provided based on age appropriate USPSTF guidelines.  BMI is >= 30 and <35. (Class 1 Obesity). The following options were offered after discussion;: exercise counseling/recommendations, nutrition counseling/recommendations, and prescribed Mounjaro primarily for diabetes but also added benefit regarding weight loss    Raghu Leonardo voices understanding and acceptance of this advice and will call back with any further questions or concerns. AVS with preventive healthcare tips printed for patient.     Vaccine Counseling:  “Discussed risks/benefits to vaccination, reviewed components of the vaccine, discussed VIS, discussed informed consent, informed consent obtained. Patient/Parent was allowed to accept or refuse vaccine. Questions answered to satisfactory state of patient/Parent. We reviewed typical age appropriate and seasonally appropriate vaccinations. Reviewed immunization history and updated state vaccination form as needed. Patient was counseled on Prevnar 20    Follow Up:   Return in about 3 months  (around 8/24/2024).    At [facility], we believe that sharing information builds trust and better relationships. You are receiving this note because you recently visited [facility]. It is possible you will see health information before a provider has talked with you about it. This kind of information can be easy to misunderstand. To help you fully understand what it means for your health, we urge you to discuss this note with your provider.    Glynn Lujan MD  [facility] [place] [facility]

## 2024-05-24 NOTE — ASSESSMENT & PLAN NOTE
Describes nonspecific fatigue though certainly appears clinically well.  Did have extensive laboratory testing in 12/2023 with only related mild abnormality being subclinical hyperthyroidism not likely relevant.  Will update limited testing including repeat TFTs, BMP, and testosterone level.

## 2024-05-24 NOTE — ASSESSMENT & PLAN NOTE
Pleasant 58-year-old male presenting for complete physical, health issues being addressed as detailed below, health maintenance includes full set of laboratory testing from 12/2023 satisfactory other than updating limited testing as detailed below, colonoscopy from proximately 1/2024 reported by patient to be normal, data deficient with plan to obtain procedure report, administer Prevnar 20 today and recommend obtaining Shingrix and COVID-19 vaccines outside the office, keep up-to-date with flu vaccine recommendations, update diabetic eye evaluation.

## 2024-05-24 NOTE — ASSESSMENT & PLAN NOTE
Some sensory changes more so on the left leg which may be related to sciatica, no abnormalities noted otherwise, hemoglobin A1c 7.0% in 12/2023 improved to 6.0% today taking multiple meds including metformin 1000 mg twice daily, Amaryl 4 mg daily, Actos 45 mg daily, Jardiance 10 mg daily, and Mounjaro 2.5 mg subcu weekly.  Given tight glycemic control, will plan to discontinue Amaryl well increase Mounjaro up to 5 mg subcu weekly, this given overall cardiovascular benefits as well as to further augment attempts at weight loss.  If his blood glucose is running consistently less than 80, he has been advised subsequently to discontinue his Actos as the next step.  Advised of any ongoing issues subsequently.  Patient due for yearly diabetic eye evaluation.  Continue healthy lifestyle efforts.

## 2024-05-30 ENCOUNTER — TELEPHONE (OUTPATIENT)
Dept: FAMILY MEDICINE CLINIC | Facility: CLINIC | Age: 59
End: 2024-05-30
Payer: COMMERCIAL

## 2024-05-30 DIAGNOSIS — E05.90 SUBCLINICAL HYPERTHYROIDISM: ICD-10-CM

## 2024-05-30 DIAGNOSIS — N18.32 STAGE 3B CHRONIC KIDNEY DISEASE: Primary | ICD-10-CM

## 2024-05-30 DIAGNOSIS — I10 PRIMARY HYPERTENSION: ICD-10-CM

## 2024-05-30 RX ORDER — LISINOPRIL 20 MG/1
20 TABLET ORAL DAILY
Qty: 90 TABLET | Refills: 0 | Status: SHIPPED | OUTPATIENT
Start: 2024-05-30

## 2024-05-30 NOTE — TELEPHONE ENCOUNTER
Conversation with patient regarding results of recent laboratory testing on 5/29/2024 as follows:    BMP revealed a creatinine 1.6 and GFR 35, versus prior creatinine of 1.37 and GFR of 65 months prior  TSH and free T4 both normal    Assessment/plan:  1.  Chronic kidney disease.  Has had some progression from stage II down to stage IIIb, noting does take lisinopril/HCTZ 20/25 mg daily as an antihypertensive regimen.  Plan changed to plain lisinopril 20 mg daily, monitoring blood pressures closely, and repeat a BMP in 1 month to assess whether this change improves his renal function.  Monitor blood pressures closely to ensure does not become elevated consistently greater than 140/90.  2.  Prior subclinical hypothyroidism with normalization of thyroid function testing.

## 2024-06-11 ENCOUNTER — TELEPHONE (OUTPATIENT)
Dept: FAMILY MEDICINE CLINIC | Facility: CLINIC | Age: 59
End: 2024-06-11
Payer: COMMERCIAL

## 2024-06-11 NOTE — TELEPHONE ENCOUNTER
Caller: carlos larry    Relationship: Emergency Contact    Best call back number: [phone]     Requested Prescriptions:   -ALL CURRENT PRESCRIPTIONS. PLEASE WRITE FOR 90 DAYS     Pharmacy where request should be sent: [facility] - [address] [phone] [facility] [phone] FX     Last office visit with prescribing clinician: 5/24/2024   Last telemedicine visit with prescribing clinician: Visit date not found   Next office visit with prescribing clinician: Visit date not found     Additional details provided by patient: PATIENT'S WIFE STATED THAT THEY ARE WANTING TO MOVE ALL OF HIS PRESCRIPTIONS TO [facility].    PATIENT IS OKAY ON CURRENT SUPPLY.     PLEASE NOTIFY PATIENT WHEN THESE PRESCRIPTIONS HAVE BEEN CALLED IN OR IF THERE ARE ANY QUESTIONS/CONCERNS.     Does the patient have less than a 3 day supply:  [] Yes  [x] No    Would you like a call back once the refill request has been completed: [x] Yes [] No    If the office needs to give you a call back, can they leave a voicemail: [x] Yes [] No    Debbie Alford Rep   06/11/24 15:47 EDT

## 2024-06-17 ENCOUNTER — TELEPHONE (OUTPATIENT)
Dept: FAMILY MEDICINE CLINIC | Facility: CLINIC | Age: 59
End: 2024-06-17
Payer: COMMERCIAL

## 2024-06-17 DIAGNOSIS — I10 PRIMARY HYPERTENSION: ICD-10-CM

## 2024-06-17 DIAGNOSIS — E11.42 TYPE 2 DIABETES MELLITUS WITH DIABETIC POLYNEUROPATHY, WITHOUT LONG-TERM CURRENT USE OF INSULIN: ICD-10-CM

## 2024-06-17 RX ORDER — ATORVASTATIN CALCIUM 40 MG/1
40 TABLET, FILM COATED ORAL NIGHTLY
Qty: 90 TABLET | Refills: 1 | Status: SHIPPED | OUTPATIENT
Start: 2024-06-17

## 2024-06-17 RX ORDER — AMITRIPTYLINE HYDROCHLORIDE 25 MG/1
25 TABLET, FILM COATED ORAL NIGHTLY
Qty: 90 TABLET | Refills: 1 | Status: SHIPPED | OUTPATIENT
Start: 2024-06-17

## 2024-06-17 RX ORDER — LISINOPRIL 20 MG/1
20 TABLET ORAL DAILY
Qty: 90 TABLET | Refills: 1 | Status: SHIPPED | OUTPATIENT
Start: 2024-06-17

## 2024-06-17 RX ORDER — OMEPRAZOLE 40 MG/1
40 CAPSULE, DELAYED RELEASE ORAL DAILY
Qty: 90 CAPSULE | Refills: 1 | Status: SHIPPED | OUTPATIENT
Start: 2024-06-17

## 2024-06-17 RX ORDER — PIOGLITAZONEHYDROCHLORIDE 15 MG/1
15 TABLET ORAL DAILY
Qty: 90 TABLET | Refills: 1 | Status: SHIPPED | OUTPATIENT
Start: 2024-06-17

## 2024-07-02 DIAGNOSIS — E11.42 TYPE 2 DIABETES MELLITUS WITH DIABETIC POLYNEUROPATHY, WITHOUT LONG-TERM CURRENT USE OF INSULIN: ICD-10-CM

## 2024-07-02 RX ORDER — TIRZEPATIDE 2.5 MG/.5ML
INJECTION, SOLUTION SUBCUTANEOUS
OUTPATIENT
Start: 2024-07-02

## 2024-08-19 DIAGNOSIS — E11.42 TYPE 2 DIABETES MELLITUS WITH DIABETIC POLYNEUROPATHY, WITHOUT LONG-TERM CURRENT USE OF INSULIN: ICD-10-CM

## 2024-08-19 NOTE — TELEPHONE ENCOUNTER
Caller: Raghu Leonardo    Relationship: Self    Best call back number: [phone]     Requested Prescriptions:   Requested Prescriptions     Pending Prescriptions Disp Refills    Tirzepatide (MOUNJARO) 5 MG/0.5ML solution pen-injector pen 2 mL 1     Sig: Inject 0.5 mL under the skin into the appropriate area as directed 1 (One) Time Per Week.        Pharmacy where request should be sent: [id number] - [place], [place] - [address] [place] [address] - [phone] [facility] [phone] FX     Last office visit with prescribing clinician: 5/24/2024   Last telemedicine visit with prescribing clinician: Visit date not found   Next office visit with prescribing clinician: Visit date not found     Additional details provided by patient: PT IS OUT OF MEDICATION.    Does the patient have less than a 3 day supply:  [x] Yes  [] No    Would you like a call back once the refill request has been completed: [] Yes [x] No    If the office needs to give you a call back, can they leave a voicemail: [] Yes [x] No    Debbie Causey Rep   08/19/24 14:48 EDT   
no

## 2024-09-26 DIAGNOSIS — E11.42 TYPE 2 DIABETES MELLITUS WITH DIABETIC POLYNEUROPATHY, WITHOUT LONG-TERM CURRENT USE OF INSULIN: ICD-10-CM

## 2024-10-21 ENCOUNTER — OFFICE VISIT (OUTPATIENT)
Dept: FAMILY MEDICINE CLINIC | Facility: CLINIC | Age: 59
End: 2024-10-21
Payer: COMMERCIAL

## 2024-10-21 VITALS
HEIGHT: 68 IN | WEIGHT: 195 LBS | SYSTOLIC BLOOD PRESSURE: 132 MMHG | TEMPERATURE: 97.8 F | DIASTOLIC BLOOD PRESSURE: 84 MMHG | OXYGEN SATURATION: 97 % | HEART RATE: 79 BPM | RESPIRATION RATE: 18 BRPM | BODY MASS INDEX: 29.55 KG/M2

## 2024-10-21 DIAGNOSIS — E05.90 SUBCLINICAL HYPERTHYROIDISM: ICD-10-CM

## 2024-10-21 DIAGNOSIS — I10 PRIMARY HYPERTENSION: ICD-10-CM

## 2024-10-21 DIAGNOSIS — E11.42 TYPE 2 DIABETES MELLITUS WITH DIABETIC POLYNEUROPATHY, WITHOUT LONG-TERM CURRENT USE OF INSULIN: Primary | ICD-10-CM

## 2024-10-21 DIAGNOSIS — E11.22 TYPE 2 DIABETES MELLITUS WITH STAGE 3B CHRONIC KIDNEY DISEASE, WITHOUT LONG-TERM CURRENT USE OF INSULIN: ICD-10-CM

## 2024-10-21 DIAGNOSIS — Z28.82 PARENT REFUSES IMMUNIZATIONS: ICD-10-CM

## 2024-10-21 DIAGNOSIS — N18.32 TYPE 2 DIABETES MELLITUS WITH STAGE 3B CHRONIC KIDNEY DISEASE, WITHOUT LONG-TERM CURRENT USE OF INSULIN: ICD-10-CM

## 2024-10-21 PROBLEM — N18.4 TYPE 2 DIABETES MELLITUS WITH STAGE 4 CHRONIC KIDNEY DISEASE, WITHOUT LONG-TERM CURRENT USE OF INSULIN: Status: RESOLVED | Noted: 2024-10-21 | Resolved: 2024-10-21

## 2024-10-21 PROBLEM — N18.4 TYPE 2 DIABETES MELLITUS WITH STAGE 4 CHRONIC KIDNEY DISEASE, WITHOUT LONG-TERM CURRENT USE OF INSULIN: Status: ACTIVE | Noted: 2024-10-21

## 2024-10-21 LAB
EXPIRATION DATE: ABNORMAL
EXPIRATION DATE: ABNORMAL
GLUCOSE BLDC GLUCOMTR-MCNC: 173 MG/DL (ref 70–130)
HBA1C MFR BLD: 6.7 % (ref 4.5–5.7)
Lab: ABNORMAL
Lab: ABNORMAL

## 2024-10-21 PROCEDURE — 82947 ASSAY GLUCOSE BLOOD QUANT: CPT | Performed by: INTERNAL MEDICINE

## 2024-10-21 PROCEDURE — 83036 HEMOGLOBIN GLYCOSYLATED A1C: CPT | Performed by: INTERNAL MEDICINE

## 2024-10-21 PROCEDURE — 99214 OFFICE O/P EST MOD 30 MIN: CPT | Performed by: INTERNAL MEDICINE

## 2024-10-21 NOTE — ASSESSMENT & PLAN NOTE
Declines at this time recommended influenza and COVID-19 vaccines.  Advised he may stop by the office at any time to obtain these.

## 2024-10-21 NOTE — PROGRESS NOTES
Follow Up Office Visit      Date: 10/21/2024   Patient Name: Raghu Leonardo  : 1965   MRN: [id number]     Chief Complaint:    Chief Complaint   Patient presents with    Follow-up       History of Present Illness: Raghu Leonardo is a 59 y.o. male who is here today for follow-up visit of his diabetes hypertension and chronic kidney disease.  Last visit in 2024 his hemoglobin A1c is 6.0% taking metformin 1000 mg twice daily, Actos 45 mg daily, Jardiance 10 mg daily and Mounjaro 5 mg subcu weekly.  He rarely checks his blood sugars indicating they are typically in the 130s, overall pursues a healthy diet with moderate physical activity.  Also last visit was noted to have progression of his kidney disease, at the time creatinine 1.6 and GFR 35 consistent with chronic kidney disease stage IIIa.  Based upon that test result his lisinopril/HCTZ 20/25 mg daily was switched to plain lisinopril 20 mg daily, with patient requiring repeat testing to ensure clinical stability or improvement.  He also has a history of subclinical hyperthyroidism with most recent testing 2024 revealing normalization of his thyroid function testing including thyroid-stimulating antibody titer TSH and free T4.  Patient physically feels well, due for his diabetic eye evaluation, no chest pains palpitations dyspnea dizziness or edema, no neuropathic subjective symptoms in his feet for otherwise.  Of note he also has lost 17 pounds over the last 5 months and 25 pounds over the last 10 months pursuing healthy lifestyle along with taking his Mounjaro injections.    Subjective      Review of Systems:   Review of Systems    I have reviewed the patients family history, social history, past medical history, past surgical history and have updated it as appropriate.     Medications:     Current Outpatient Medications:     amitriptyline (ELAVIL) 25 MG tablet, Take 1 tablet by mouth Every Night. For leg pain, Disp: 90 tablet, Rfl: 1    atorvastatin  "(LIPITOR) 40 MG tablet, Take 1 tablet by mouth Every Night., Disp: 90 tablet, Rfl: 1    empagliflozin (Jardiance) 10 MG tablet tablet, Take 1 tablet by mouth Daily. NEEDS AN APPT, Disp: 90 tablet, Rfl: 1    lisinopril (PRINIVIL,ZESTRIL) 20 MG tablet, Take 1 tablet by mouth Daily., Disp: 90 tablet, Rfl: 1    metFORMIN (GLUCOPHAGE) 1000 MG tablet, Take 1 tablet by mouth Daily With Breakfast., Disp: 90 tablet, Rfl: 1    omeprazole (priLOSEC) 40 MG capsule, Take 1 capsule by mouth Daily., Disp: 90 capsule, Rfl: 1    pioglitazone (ACTOS) 15 MG tablet, Take 1 tablet by mouth Daily., Disp: 90 tablet, Rfl: 1    Tirzepatide (MOUNJARO) 5 MG/0.5ML solution pen-injector pen, Inject 0.5 mL under the skin into the appropriate area as directed 1 (One) Time Per Week. Due for an Appt, Disp: 2 mL, Rfl: 0    Allergies:   No Known Allergies    Objective     Physical Exam: Please see above  Vital Signs:   Vitals:    10/21/24 1539 10/21/24 1605   BP: 132/84    BP Location: Left arm    Patient Position: Sitting    Cuff Size: Adult    Pulse: 79    Resp: 18    Temp: 97.8 °F (36.6 °C)    TempSrc: Temporal    SpO2: 97%    Weight: 91.5 kg (201 lb 12.8 oz) 88.5 kg (195 lb)   Height: 172.7 cm (68\")    PainSc: 0-No pain      Body mass index is 29.65 kg/m².          Physical Exam  Constitutional:       General: He is not in acute distress.     Appearance: Normal appearance. He is not ill-appearing.      Comments: General: Healthy pleasant NAD, BMI 29.6 reflecting a 17 pound weight loss over the last 5 months and a 20 pound weight loss over the last 10 months   Cardiovascular:      Rate and Rhythm: Normal rate and regular rhythm.      Heart sounds: Normal heart sounds. No murmur heard.     No friction rub. No gallop.      Comments: 2+ bipedal pulses with no edema  Pulmonary:      Effort: Pulmonary effort is normal. No respiratory distress.      Breath sounds: Normal breath sounds.   Musculoskeletal:      Cervical back: No rigidity or tenderness.    "   Right lower leg: No edema.      Left lower leg: No edema.   Lymphadenopathy:      Cervical: No cervical adenopathy.   Neurological:      General: No focal deficit present.      Mental Status: He is alert.      Cranial Nerves: No cranial nerve deficit.      Sensory: No sensory deficit.      Motor: No weakness.      Coordination: Coordination normal.      Gait: Gait normal.      Comments: Normal sensation in both feet to fine touch vibration and pinprick with motor exam normal   Psychiatric:         Mood and Affect: Mood normal.         Procedures    Results:   Labs:   Hemoglobin A1C   Date Value Ref Range Status   10/21/2024 6.7 (A) 4.5 - 5.7 % Final     TSH   Date Value Ref Range Status   12/11/2023 0.396 (L) 0.450 - 4.500 uIU/mL Final        POCT Results (if applicable):   Results for orders placed or performed in visit on 10/21/24   POC Glycosylated Hemoglobin (Hb A1C)    Collection Time: 10/21/24  4:23 PM    Specimen: Blood   Result Value Ref Range    Hemoglobin A1C 6.7 (A) 4.5 - 5.7 %    Lot Number [id number]     Expiration Date 06/10/2026    POC Glucose, Blood    Collection Time: 10/21/24  4:24 PM    Specimen: Blood   Result Value Ref Range    Glucose 173 (A) 70 - 130 mg/dL    Lot Number [id number]     Expiration Date 02/28/2025          Assessment / Plan      Assessment/Plan:   Diagnoses and all orders for this visit:    1. Type 2 diabetes mellitus with diabetic polyneuropathy, without long-term current use of insulin (Primary)  Assessment & Plan:  Has had neuropathic symptoms in the past but currently asymptomatic taking amitriptyline 25 mg nightly, diabetic control slightly deteriorated with a hemoglobin A1c today of 6.7% versus 6.0% in 5/2024.  Patient indicates overall he is pursuing a healthy lifestyle.  Advised to continue these efforts, for now continue his metformin 1000 mg twice daily, Actos 45 mg daily, Jardiance 10 mg daily and Mounjaro 5 mg nightly.  Reassess clinically at his follow-up complete  physical in 6 to 7 months.  Update diabetic eye evaluation advise if problems in the interim.    Orders:  -     POC Glycosylated Hemoglobin (Hb A1C)  -     POC Glucose, Blood    2. Type 2 diabetes mellitus with stage 3b chronic kidney disease, without long-term current use of insulin  Assessment & Plan:  Slight decline in diabetic control since 5/2024 hemoglobin A1c today of 6.7% versus 0.0%, taking Mounjaro 5 mg subcu weekly, Jardiance 10 mg daily, Actos 45 mg daily and metformin 1000 mg twice daily.  Will continue current regimen for now including healthy lifestyle efforts, repeating hemoglobin today at his upcoming complete physical in 5/2024.  Regarding his chronic kidney disease, 5 months ago in 5/2020 for his HCTZ component of the lisinopril was discontinued and he currently takes lisinopril 20 mg daily as antihypertensive monotherapy, along with Jardiance and Mounjaro which can also benefit his renal function.  He will stop by the office on his own accord in the next week or so to obtain requested BMP, noting not obtained today given late hour of his office visit.    Orders:  -     POC Glycosylated Hemoglobin (Hb A1C)  -     POC Glucose, Blood  -     Basic Metabolic Panel; Future    3. Primary hypertension  Assessment & Plan:  Overall maintaining very stable acute blood pressure control with chronic control unknown taking lisinopril 20 mg daily, with HCTZ component having been discontinued in 5/2024 given progression of his chronic kidney disease.  Advised start monitoring blood pressures more diligently with ideal parameters discussed, continue healthy lifestyle efforts with regular exercise, portion control of foods with healthy food choices and avoidance of added salt.    Orders:  -     Basic Metabolic Panel; Future    4. Subclinical hyperthyroidism  Assessment & Plan:  Mildly depressed TSH with normal free T4 per testing in 12/2023.  Repeat with TSH free T4 and thyroid antibody titers are all normalized  in 5/2024.  Follow-up periodically.      5. Parent refuses immunizations  Assessment & Plan:  Declines at this time recommended influenza and COVID-19 vaccines.  Advised he may stop by the office at any time to obtain these.          Follow Up:   Return in about 7 months (around 5/21/2025) for Annual physical.      At [facility], we believe that sharing information builds trust and better relationships. You are receiving this note because you recently visited [facility]. It is possible you will see health information before a provider has talked with you about it. This kind of information can be easy to misunderstand. To help you fully understand what it means for your health, we urge you to discuss this note with your provider.    Glynn Lujan MD  [facility]

## 2024-10-21 NOTE — ASSESSMENT & PLAN NOTE
Has had neuropathic symptoms in the past but currently asymptomatic taking amitriptyline 25 mg nightly, diabetic control slightly deteriorated with a hemoglobin A1c today of 6.7% versus 6.0% in 5/2024.  Patient indicates overall he is pursuing a healthy lifestyle.  Advised to continue these efforts, for now continue his metformin 1000 mg twice daily, Actos 45 mg daily, Jardiance 10 mg daily and Mounjaro 5 mg nightly.  Reassess clinically at his follow-up complete physical in 6 to 7 months.  Update diabetic eye evaluation advise if problems in the interim.

## 2024-10-21 NOTE — ASSESSMENT & PLAN NOTE
Mildly depressed TSH with normal free T4 per testing in 12/2023.  Repeat with TSH free T4 and thyroid antibody titers are all normalized in 5/2024.  Follow-up periodically.

## 2024-10-21 NOTE — ASSESSMENT & PLAN NOTE
Overall maintaining very stable acute blood pressure control with chronic control unknown taking lisinopril 20 mg daily, with HCTZ component having been discontinued in 5/2024 given progression of his chronic kidney disease.  Advised start monitoring blood pressures more diligently with ideal parameters discussed, continue healthy lifestyle efforts with regular exercise, portion control of foods with healthy food choices and avoidance of added salt.

## 2024-10-21 NOTE — ASSESSMENT & PLAN NOTE
Slight decline in diabetic control since 5/2024 hemoglobin A1c today of 6.7% versus 0.0%, taking Mounjaro 5 mg subcu weekly, Jardiance 10 mg daily, Actos 45 mg daily and metformin 1000 mg twice daily.  Will continue current regimen for now including healthy lifestyle efforts, repeating hemoglobin today at his upcoming complete physical in 5/2024.  Regarding his chronic kidney disease, 5 months ago in 5/2020 for his HCTZ component of the lisinopril was discontinued and he currently takes lisinopril 20 mg daily as antihypertensive monotherapy, along with Jardiance and Mounjaro which can also benefit his renal function.  He will stop by the office on his own accord in the next week or so to obtain requested BMP, noting not obtained today given late hour of his office visit.

## 2024-11-06 DIAGNOSIS — E11.42 TYPE 2 DIABETES MELLITUS WITH DIABETIC POLYNEUROPATHY, WITHOUT LONG-TERM CURRENT USE OF INSULIN: ICD-10-CM

## 2024-11-06 DIAGNOSIS — I10 PRIMARY HYPERTENSION: ICD-10-CM

## 2024-11-06 RX ORDER — LISINOPRIL 20 MG/1
20 TABLET ORAL DAILY
Qty: 90 TABLET | Refills: 1 | Status: SHIPPED | OUTPATIENT
Start: 2024-11-06

## 2024-11-06 RX ORDER — PIOGLITAZONEHYDROCHLORIDE 15 MG/1
15 TABLET ORAL DAILY
Qty: 90 TABLET | Refills: 1 | Status: SHIPPED | OUTPATIENT
Start: 2024-11-06

## 2024-11-06 RX ORDER — ATORVASTATIN CALCIUM 40 MG/1
40 TABLET, FILM COATED ORAL NIGHTLY
Qty: 90 TABLET | Refills: 1 | Status: SHIPPED | OUTPATIENT
Start: 2024-11-06

## 2024-11-06 RX ORDER — OMEPRAZOLE 40 MG/1
40 CAPSULE, DELAYED RELEASE ORAL DAILY
Qty: 90 CAPSULE | Refills: 1 | Status: SHIPPED | OUTPATIENT
Start: 2024-11-06

## 2024-11-06 NOTE — TELEPHONE ENCOUNTER
Caller: Raghu Leonardo    Relationship: Self    Best call back number: [phone]    Requested Prescriptions:   Requested Prescriptions     Pending Prescriptions Disp Refills    metFORMIN (GLUCOPHAGE) 1000 MG tablet 90 tablet 1     Sig: Take 1 tablet by mouth Daily With Breakfast.    amitriptyline (ELAVIL) 25 MG tablet 90 tablet 1     Sig: Take 1 tablet by mouth Every Night. For leg pain    atorvastatin (LIPITOR) 40 MG tablet 90 tablet 1     Sig: Take 1 tablet by mouth Every Night.    empagliflozin (Jardiance) 10 MG tablet tablet 90 tablet 1     Sig: Take 1 tablet by mouth Daily. NEEDS AN APPT    lisinopril (PRINIVIL,ZESTRIL) 20 MG tablet 90 tablet 1     Sig: Take 1 tablet by mouth Daily.    omeprazole (priLOSEC) 40 MG capsule 90 capsule 1     Sig: Take 1 capsule by mouth Daily.    pioglitazone (ACTOS) 15 MG tablet 90 tablet 1     Sig: Take 1 tablet by mouth Daily.        Pharmacy where request should be sent: [facility] [address] [phone] [place] [phone]      Last office visit with prescribing clinician: 10/21/2024   Last telemedicine visit with prescribing clinician: Visit date not found   Next office visit with prescribing clinician: Visit date not found     Additional details provided by patient: PATIENT NEEDS A REFILL     Does the patient have less than a 3 day supply:  [] Yes  [x] No    Would you like a call back once the refill request has been completed: [] Yes [x] No    If the office needs to give you a call back, can they leave a voicemail: [] Yes [x] No    Debbie Jones Rep   11/06/24 10:55 EST

## 2024-11-07 ENCOUNTER — CLINICAL SUPPORT (OUTPATIENT)
Dept: FAMILY MEDICINE CLINIC | Facility: CLINIC | Age: 59
End: 2024-11-07
Payer: COMMERCIAL

## 2024-11-07 DIAGNOSIS — I10 PRIMARY HYPERTENSION: ICD-10-CM

## 2024-11-07 DIAGNOSIS — E11.22 TYPE 2 DIABETES MELLITUS WITH STAGE 3B CHRONIC KIDNEY DISEASE, WITHOUT LONG-TERM CURRENT USE OF INSULIN: ICD-10-CM

## 2024-11-07 DIAGNOSIS — N18.32 TYPE 2 DIABETES MELLITUS WITH STAGE 3B CHRONIC KIDNEY DISEASE, WITHOUT LONG-TERM CURRENT USE OF INSULIN: ICD-10-CM

## 2024-11-07 PROCEDURE — 36415 COLL VENOUS BLD VENIPUNCTURE: CPT | Performed by: INTERNAL MEDICINE

## 2024-11-07 NOTE — PROGRESS NOTES
Venipuncture Blood Specimen Collection  Venipuncture performed in left arm by Radha Estrella MA with good hemostasis. Patient tolerated the procedure well without complications.   11/07/24   Radha Estrella MA

## 2024-11-08 LAB
BUN SERPL-MCNC: 15 MG/DL (ref 6–24)
BUN/CREAT SERPL: 10 (ref 9–20)
CALCIUM SERPL-MCNC: 10.2 MG/DL (ref 8.7–10.2)
CHLORIDE SERPL-SCNC: 102 MMOL/L (ref 96–106)
CO2 SERPL-SCNC: 23 MMOL/L (ref 20–29)
CREAT SERPL-MCNC: 1.52 MG/DL (ref 0.76–1.27)
EGFRCR SERPLBLD CKD-EPI 2021: 52 ML/MIN/1.73
GLUCOSE SERPL-MCNC: 93 MG/DL (ref 70–99)
POTASSIUM SERPL-SCNC: 4.6 MMOL/L (ref 3.5–5.2)
SODIUM SERPL-SCNC: 139 MMOL/L (ref 134–144)

## 2024-11-12 ENCOUNTER — TELEPHONE (OUTPATIENT)
Dept: FAMILY MEDICINE CLINIC | Facility: CLINIC | Age: 59
End: 2024-11-12
Payer: COMMERCIAL

## 2024-11-12 NOTE — TELEPHONE ENCOUNTER
Reviewed lab testing from 11/7/2024 as follows:    BMP reveals a creatinine of 1.52 and GFR 52, versus most recent prior creatinine of 1.6 with GFR 35 on 5/29/2024.  Prior to that creatinine was 1.37 with GFR of 60 in 12/2023.    Assessment/plan:  1.  Chronic kidney disease, currently stage IIIa, improved slightly from prior testing.  Patient had discontinuation of HCTZ with continuation of his lisinopril 20 mg daily before most recent testing.  Plan continue current regimen for now repeating BMP at recommended complete physical in approximately 6 months.  2.  Patient has been advised to schedule complete physical sometime after 5/24/2025.  3.  Message left on patient's answer machine that I will contact him back to discuss results and recommendations.    Addendum 11/13/2024:  The above test results were discussed with patient.  Plan as above recommended patient schedule complete physical on or after 5/24/2025.

## 2024-11-30 DIAGNOSIS — E11.42 TYPE 2 DIABETES MELLITUS WITH DIABETIC POLYNEUROPATHY, WITHOUT LONG-TERM CURRENT USE OF INSULIN: ICD-10-CM

## 2024-12-02 ENCOUNTER — TELEPHONE (OUTPATIENT)
Dept: FAMILY MEDICINE CLINIC | Facility: CLINIC | Age: 59
End: 2024-12-02
Payer: COMMERCIAL

## 2024-12-02 RX ORDER — TIRZEPATIDE 5 MG/.5ML
INJECTION, SOLUTION SUBCUTANEOUS
Qty: 0.5 ML | Refills: 3 | Status: SHIPPED | OUTPATIENT
Start: 2024-12-02

## 2024-12-02 NOTE — TELEPHONE ENCOUNTER
PT CALLED REQUESTING REFILL OF MOUNJARO SENT TO [facility] IN [place].

## 2025-04-04 DIAGNOSIS — E11.42 TYPE 2 DIABETES MELLITUS WITH DIABETIC POLYNEUROPATHY, WITHOUT LONG-TERM CURRENT USE OF INSULIN: ICD-10-CM

## 2025-04-04 RX ORDER — TIRZEPATIDE 5 MG/.5ML
INJECTION, SOLUTION SUBCUTANEOUS
Qty: 2 ML | Refills: 3 | Status: SHIPPED | OUTPATIENT
Start: 2025-04-04

## 2025-05-12 DIAGNOSIS — I10 PRIMARY HYPERTENSION: ICD-10-CM

## 2025-05-12 DIAGNOSIS — E11.42 TYPE 2 DIABETES MELLITUS WITH DIABETIC POLYNEUROPATHY, WITHOUT LONG-TERM CURRENT USE OF INSULIN: ICD-10-CM

## 2025-05-12 RX ORDER — PIOGLITAZONE 15 MG/1
15 TABLET ORAL DAILY
Qty: 90 TABLET | Refills: 0 | Status: SHIPPED | OUTPATIENT
Start: 2025-05-12

## 2025-05-12 RX ORDER — EMPAGLIFLOZIN 10 MG/1
10 TABLET, FILM COATED ORAL DAILY
Qty: 90 TABLET | Refills: 0 | Status: SHIPPED | OUTPATIENT
Start: 2025-05-12

## 2025-05-12 RX ORDER — LISINOPRIL 20 MG/1
20 TABLET ORAL DAILY
Qty: 90 TABLET | Refills: 0 | Status: SHIPPED | OUTPATIENT
Start: 2025-05-12

## 2025-05-12 RX ORDER — ATORVASTATIN CALCIUM 40 MG/1
40 TABLET, FILM COATED ORAL NIGHTLY
Qty: 90 TABLET | Refills: 0 | Status: SHIPPED | OUTPATIENT
Start: 2025-05-12

## 2025-05-12 RX ORDER — OMEPRAZOLE 40 MG/1
40 CAPSULE, DELAYED RELEASE ORAL DAILY
Qty: 90 CAPSULE | Refills: 0 | Status: SHIPPED | OUTPATIENT
Start: 2025-05-12

## 2025-05-12 NOTE — TELEPHONE ENCOUNTER
I have left him a vm reminding him that he is due for a physical after 5/24/2025. I have let him know that Dr. Maki will fill his medication but he needs to call our office to scheduled a physical and be seen before running out of medication. TF

## 2025-07-29 DIAGNOSIS — E11.42 TYPE 2 DIABETES MELLITUS WITH DIABETIC POLYNEUROPATHY, WITHOUT LONG-TERM CURRENT USE OF INSULIN: ICD-10-CM

## 2025-07-30 RX ORDER — TIRZEPATIDE 5 MG/.5ML
INJECTION, SOLUTION SUBCUTANEOUS
Refills: 3 | OUTPATIENT
Start: 2025-07-30

## 2025-08-04 ENCOUNTER — OFFICE VISIT (OUTPATIENT)
Dept: FAMILY MEDICINE CLINIC | Facility: CLINIC | Age: 60
End: 2025-08-04
Payer: COMMERCIAL

## 2025-08-04 VITALS
BODY MASS INDEX: 30.62 KG/M2 | WEIGHT: 202 LBS | OXYGEN SATURATION: 97 % | TEMPERATURE: 97.9 F | DIASTOLIC BLOOD PRESSURE: 84 MMHG | HEIGHT: 68 IN | SYSTOLIC BLOOD PRESSURE: 132 MMHG | HEART RATE: 71 BPM

## 2025-08-04 DIAGNOSIS — E11.22 TYPE 2 DIABETES MELLITUS WITH STAGE 3B CHRONIC KIDNEY DISEASE, WITHOUT LONG-TERM CURRENT USE OF INSULIN: ICD-10-CM

## 2025-08-04 DIAGNOSIS — N18.32 TYPE 2 DIABETES MELLITUS WITH STAGE 3B CHRONIC KIDNEY DISEASE, WITHOUT LONG-TERM CURRENT USE OF INSULIN: ICD-10-CM

## 2025-08-04 DIAGNOSIS — E11.42 TYPE 2 DIABETES MELLITUS WITH DIABETIC POLYNEUROPATHY, WITHOUT LONG-TERM CURRENT USE OF INSULIN: Primary | ICD-10-CM

## 2025-08-04 DIAGNOSIS — E66.9 MILD OBESITY: ICD-10-CM

## 2025-08-04 DIAGNOSIS — I10 PRIMARY HYPERTENSION: ICD-10-CM

## 2025-08-04 LAB
EXPIRATION DATE: ABNORMAL
EXPIRATION DATE: NORMAL
GLUCOSE BLDC GLUCOMTR-MCNC: 129 MG/DL (ref 70–130)
HBA1C MFR BLD: 6.6 % (ref 4.5–5.7)
Lab: ABNORMAL
Lab: NORMAL

## 2025-08-04 PROCEDURE — 99214 OFFICE O/P EST MOD 30 MIN: CPT | Performed by: INTERNAL MEDICINE

## 2025-08-04 PROCEDURE — 83036 HEMOGLOBIN GLYCOSYLATED A1C: CPT | Performed by: INTERNAL MEDICINE

## 2025-08-04 PROCEDURE — 82947 ASSAY GLUCOSE BLOOD QUANT: CPT | Performed by: INTERNAL MEDICINE

## 2025-08-05 DIAGNOSIS — E11.42 TYPE 2 DIABETES MELLITUS WITH DIABETIC POLYNEUROPATHY, WITHOUT LONG-TERM CURRENT USE OF INSULIN: ICD-10-CM

## 2025-08-06 RX ORDER — TIRZEPATIDE 5 MG/.5ML
INJECTION, SOLUTION SUBCUTANEOUS
Refills: 3 | OUTPATIENT
Start: 2025-08-06

## 2025-08-07 ENCOUNTER — PATIENT ROUNDING (BHMG ONLY) (OUTPATIENT)
Dept: FAMILY MEDICINE CLINIC | Facility: CLINIC | Age: 60
End: 2025-08-07
Payer: COMMERCIAL

## 2025-08-07 DIAGNOSIS — E11.42 TYPE 2 DIABETES MELLITUS WITH DIABETIC POLYNEUROPATHY, WITHOUT LONG-TERM CURRENT USE OF INSULIN: ICD-10-CM

## 2025-08-07 RX ORDER — TIRZEPATIDE 5 MG/.5ML
5 INJECTION, SOLUTION SUBCUTANEOUS WEEKLY
Qty: 2 ML | Refills: 0 | Status: SHIPPED | OUTPATIENT
Start: 2025-08-07 | End: 2025-08-11 | Stop reason: SDUPTHER

## 2025-08-11 DIAGNOSIS — I10 PRIMARY HYPERTENSION: ICD-10-CM

## 2025-08-11 DIAGNOSIS — E11.42 TYPE 2 DIABETES MELLITUS WITH DIABETIC POLYNEUROPATHY, WITHOUT LONG-TERM CURRENT USE OF INSULIN: ICD-10-CM

## 2025-08-11 RX ORDER — EMPAGLIFLOZIN 10 MG/1
10 TABLET, FILM COATED ORAL DAILY
Qty: 90 TABLET | Refills: 3 | Status: SHIPPED | OUTPATIENT
Start: 2025-08-11

## 2025-08-11 RX ORDER — PIOGLITAZONE 15 MG/1
15 TABLET ORAL DAILY
Qty: 90 TABLET | Refills: 3 | Status: SHIPPED | OUTPATIENT
Start: 2025-08-11

## 2025-08-11 RX ORDER — LISINOPRIL 20 MG/1
20 TABLET ORAL DAILY
Qty: 90 TABLET | Refills: 3 | Status: SHIPPED | OUTPATIENT
Start: 2025-08-11

## 2025-08-11 RX ORDER — OMEPRAZOLE 40 MG/1
40 CAPSULE, DELAYED RELEASE ORAL DAILY
Qty: 90 CAPSULE | Refills: 3 | Status: SHIPPED | OUTPATIENT
Start: 2025-08-11

## 2025-08-11 RX ORDER — TIRZEPATIDE 5 MG/.5ML
5 INJECTION, SOLUTION SUBCUTANEOUS WEEKLY
Qty: 2 ML | Refills: 0 | Status: SHIPPED | OUTPATIENT
Start: 2025-08-11 | End: 2025-08-13 | Stop reason: SDUPTHER

## 2025-08-11 RX ORDER — ATORVASTATIN CALCIUM 40 MG/1
40 TABLET, FILM COATED ORAL NIGHTLY
Qty: 90 TABLET | Refills: 3 | Status: SHIPPED | OUTPATIENT
Start: 2025-08-11

## 2025-08-13 DIAGNOSIS — E11.42 TYPE 2 DIABETES MELLITUS WITH DIABETIC POLYNEUROPATHY, WITHOUT LONG-TERM CURRENT USE OF INSULIN: ICD-10-CM

## 2025-08-13 RX ORDER — TIRZEPATIDE 5 MG/.5ML
5 INJECTION, SOLUTION SUBCUTANEOUS WEEKLY
Qty: 2 ML | Refills: 0 | Status: SHIPPED | OUTPATIENT
Start: 2025-08-13

## 2025-08-21 ENCOUNTER — OFFICE VISIT (OUTPATIENT)
Dept: FAMILY MEDICINE CLINIC | Facility: CLINIC | Age: 60
End: 2025-08-21
Payer: COMMERCIAL

## 2025-08-21 VITALS
DIASTOLIC BLOOD PRESSURE: 82 MMHG | HEIGHT: 68 IN | HEART RATE: 77 BPM | OXYGEN SATURATION: 98 % | TEMPERATURE: 98.2 F | BODY MASS INDEX: 30.58 KG/M2 | SYSTOLIC BLOOD PRESSURE: 116 MMHG | WEIGHT: 201.8 LBS

## 2025-08-21 DIAGNOSIS — I10 PRIMARY HYPERTENSION: ICD-10-CM

## 2025-08-21 DIAGNOSIS — E66.9 MILD OBESITY: ICD-10-CM

## 2025-08-21 DIAGNOSIS — Z12.5 SCREENING FOR PROSTATE CANCER: ICD-10-CM

## 2025-08-21 DIAGNOSIS — G56.03 BILATERAL CARPAL TUNNEL SYNDROME: ICD-10-CM

## 2025-08-21 DIAGNOSIS — K21.9 GASTROESOPHAGEAL REFLUX DISEASE, UNSPECIFIED WHETHER ESOPHAGITIS PRESENT: ICD-10-CM

## 2025-08-21 DIAGNOSIS — E78.5 DYSLIPIDEMIA: ICD-10-CM

## 2025-08-21 DIAGNOSIS — E11.42 TYPE 2 DIABETES MELLITUS WITH DIABETIC POLYNEUROPATHY, WITHOUT LONG-TERM CURRENT USE OF INSULIN: ICD-10-CM

## 2025-08-21 DIAGNOSIS — Z00.01 ENCOUNTER FOR GENERAL ADULT MEDICAL EXAMINATION WITH ABNORMAL FINDINGS: Primary | ICD-10-CM

## 2025-08-21 DIAGNOSIS — E55.9 VITAMIN D DEFICIENCY: ICD-10-CM

## 2025-08-21 DIAGNOSIS — R79.89 LOW TESTOSTERONE LEVEL IN MALE: ICD-10-CM

## 2025-08-21 DIAGNOSIS — E11.22 TYPE 2 DIABETES MELLITUS WITH STAGE 3A CHRONIC KIDNEY DISEASE, WITHOUT LONG-TERM CURRENT USE OF INSULIN: ICD-10-CM

## 2025-08-21 DIAGNOSIS — N18.31 TYPE 2 DIABETES MELLITUS WITH STAGE 3A CHRONIC KIDNEY DISEASE, WITHOUT LONG-TERM CURRENT USE OF INSULIN: ICD-10-CM

## 2025-08-21 DIAGNOSIS — E05.90 SUBCLINICAL HYPERTHYROIDISM: ICD-10-CM

## 2025-08-21 DIAGNOSIS — N52.9 ERECTILE DYSFUNCTION, UNSPECIFIED ERECTILE DYSFUNCTION TYPE: ICD-10-CM

## 2025-08-21 LAB
EXPIRATION DATE: NORMAL
Lab: NORMAL
POC ALBUMIN, URINE: 30 MG/L
POC CREATININE, URINE: 100 MG/DL
POC URINE ALB/CREA RATIO: <30

## 2025-08-21 RX ORDER — SILDENAFIL 100 MG/1
TABLET, FILM COATED ORAL
Qty: 30 TABLET | Refills: 0 | Status: SHIPPED | OUTPATIENT
Start: 2025-08-21

## 2025-08-22 ENCOUNTER — RESULTS FOLLOW-UP (OUTPATIENT)
Dept: FAMILY MEDICINE CLINIC | Facility: CLINIC | Age: 60
End: 2025-08-22
Payer: COMMERCIAL

## 2025-08-22 LAB
25(OH)D3+25(OH)D2 SERPL-MCNC: 41.5 NG/ML (ref 30–100)
ALBUMIN SERPL-MCNC: 4.8 G/DL (ref 3.8–4.9)
ALP SERPL-CCNC: 119 IU/L (ref 44–121)
ALT SERPL-CCNC: 24 IU/L (ref 0–44)
APPEARANCE UR: CLEAR
AST SERPL-CCNC: 21 IU/L (ref 0–40)
BACTERIA #/AREA URNS HPF: NORMAL /[HPF]
BILIRUB SERPL-MCNC: 1.1 MG/DL (ref 0–1.2)
BILIRUB UR QL STRIP: NEGATIVE
BUN SERPL-MCNC: 21 MG/DL (ref 8–27)
BUN/CREAT SERPL: 15 (ref 10–24)
CALCIUM SERPL-MCNC: 9.8 MG/DL (ref 8.6–10.2)
CASTS URNS QL MICRO: NORMAL /LPF
CHLORIDE SERPL-SCNC: 103 MMOL/L (ref 96–106)
CHOLEST SERPL-MCNC: 163 MG/DL (ref 100–199)
CO2 SERPL-SCNC: 17 MMOL/L (ref 20–29)
COLOR UR: YELLOW
CREAT SERPL-MCNC: 1.4 MG/DL (ref 0.76–1.27)
EGFRCR SERPLBLD CKD-EPI 2021: 58 ML/MIN/1.73
EPI CELLS #/AREA URNS HPF: NORMAL /HPF (ref 0–10)
GLOBULIN SER CALC-MCNC: 2.3 G/DL (ref 1.5–4.5)
GLUCOSE SERPL-MCNC: ABNORMAL MG/DL
GLUCOSE UR QL STRIP: ABNORMAL
HDLC SERPL-MCNC: 41 MG/DL
HGB UR QL STRIP: NEGATIVE
KETONES UR QL STRIP: NEGATIVE
LDLC SERPL CALC-MCNC: 89 MG/DL (ref 0–99)
LEUKOCYTE ESTERASE UR QL STRIP: NEGATIVE
MICRO URNS: ABNORMAL
MICRO URNS: ABNORMAL
NITRITE UR QL STRIP: NEGATIVE
PH UR STRIP: 5.5 [PH] (ref 5–7.5)
POTASSIUM SERPL-SCNC: ABNORMAL MMOL/L
PROT SERPL-MCNC: 7.1 G/DL (ref 6–8.5)
PROT UR QL STRIP: NEGATIVE
PSA SERPL-MCNC: 2.4 NG/ML (ref 0–4)
RBC #/AREA URNS HPF: NORMAL /HPF (ref 0–2)
SODIUM SERPL-SCNC: 140 MMOL/L (ref 134–144)
SP GR UR STRIP: 1.03 (ref 1–1.03)
T4 FREE SERPL-MCNC: 1.3 NG/DL (ref 0.82–1.77)
TESTOST SERPL-MCNC: 470 NG/DL (ref 264–916)
TRIGL SERPL-MCNC: 194 MG/DL (ref 0–149)
TSH SERPL DL<=0.005 MIU/L-ACNC: 2.28 UIU/ML (ref 0.45–4.5)
URINALYSIS REFLEX: ABNORMAL
UROBILINOGEN UR STRIP-MCNC: 0.2 MG/DL (ref 0.2–1)
VLDLC SERPL CALC-MCNC: 33 MG/DL (ref 5–40)
WBC #/AREA URNS HPF: NORMAL /HPF (ref 0–5)